# Patient Record
Sex: MALE | Race: AMERICAN INDIAN OR ALASKA NATIVE
[De-identification: names, ages, dates, MRNs, and addresses within clinical notes are randomized per-mention and may not be internally consistent; named-entity substitution may affect disease eponyms.]

---

## 2020-05-05 ENCOUNTER — HOSPITAL ENCOUNTER (EMERGENCY)
Dept: HOSPITAL 5 - ED | Age: 40
Discharge: HOME | End: 2020-05-05
Payer: SELF-PAY

## 2020-05-05 VITALS — SYSTOLIC BLOOD PRESSURE: 105 MMHG | DIASTOLIC BLOOD PRESSURE: 66 MMHG

## 2020-05-05 DIAGNOSIS — G89.29: ICD-10-CM

## 2020-05-05 DIAGNOSIS — M54.5: Primary | ICD-10-CM

## 2020-05-05 DIAGNOSIS — M54.2: ICD-10-CM

## 2020-05-05 DIAGNOSIS — M62.838: ICD-10-CM

## 2020-05-05 DIAGNOSIS — Z79.899: ICD-10-CM

## 2020-05-05 DIAGNOSIS — H93.13: ICD-10-CM

## 2020-05-05 DIAGNOSIS — F17.200: ICD-10-CM

## 2020-05-05 DIAGNOSIS — F12.10: ICD-10-CM

## 2020-05-05 PROCEDURE — 96372 THER/PROPH/DIAG INJ SC/IM: CPT

## 2020-05-05 PROCEDURE — 99282 EMERGENCY DEPT VISIT SF MDM: CPT

## 2020-05-05 NOTE — EMERGENCY DEPARTMENT REPORT
ED Back Pain/Injury HPI





- General


Chief Complaint: Back Pain/Injury


Stated Complaint: EARS RINGING/BACK/NECK PAIN


Source: patient


Limitations: No Limitations





- History of Present Illness


Initial Comments: 





Patient is a 39-year-old -American male with history of chronic low back 

pain and chronic neck pain from lumbar disc and cervical disc disease and who 

presents to the ED with worsening neck and low back pain for the last 2 weeks 

after he ran out of his medication, Percocet 10 mg - 325 mg that he takes for 

his chronic pain.  Patient also complains of bilateral tinnitus for the last 2 

weeks despite using over-the-counter remedies.  Patient denies headache, 

dizziness, nausea, vomiting, dizziness, cough, nasal and sinus congestion, sore 

throat, heavy lifting, fall, traumatic injury, hematuria, abdominal pain, chest 

pain, shortness of breath or numbness and tingling or weakness of upper and 

lower extremities bilaterally.


MD Complaint: back pain (chronic pain), other (neck pain; bilateral tinnitus)


-: Sudden, week(s) (2)


Similar Symptoms Previously: Yes


Place: home


Radiation: none


Severity: severe


Severity scale (0 -10): 7


Quality: sharp, aching


Consistency: constant


Improves With: none


Worsens With: movement, walking


Associated Symptoms: denies other symptoms.  denies: confusion, weakness, chest 

pain, difficulty walking, cough, difficulty urinating, diaphoresis, 

incontinence, constipation, headaches, abdominal pain, loss of appetite, 

malaise, nausea/vomiting, shortness of breath, syncope, other


Treatments Prior to Arrival: NSAIDS





- Related Data


                                  Previous Rx's











 Medication  Instructions  Recorded  Last Taken  Type


 


Baclofen 20 mg PO Q8H PRN #24 tablet 05/05/20 Unknown Rx


 


Naproxen 500 mg PO Q12H PRN #30 tablet 05/05/20 Unknown Rx


 


Nortriptyline HCl 50 mg PO QHS PRN #30 capsule 05/05/20 Unknown Rx


 


Prednisone [predniSONE 10 mg 10 mg PO .TAPER #21 tab.ds.pk 05/05/20 Unknown Rx





(6-Day Pack, 21 Tabs)]    











                                    Allergies











Allergy/AdvReac Type Severity Reaction Status Date / Time


 


No Known Allergies Allergy   Unverified 05/05/20 18:33














ED Review of Systems


ROS: 


Stated complaint: EARS RINGING/BACK/NECK PAIN


Other details as noted in HPI





Constitutional: denies: chills, fever


Eyes: denies: eye pain, eye discharge, vision change


ENT: ear pain (Bilateral tinnitus).  denies: throat pain


Respiratory: denies: cough, shortness of breath, wheezing


Cardiovascular: denies: chest pain, palpitations


Endocrine: no symptoms reported


Gastrointestinal: denies: abdominal pain, nausea, diarrhea


Genitourinary: denies: urgency, dysuria


Musculoskeletal: back pain (lower), arthralgia (neck pain).  denies: joint 

swelling


Skin: denies: rash, lesions


Neurological: denies: headache, weakness, paresthesias


Psychiatric: denies: anxiety, depression


Hematological/Lymphatic: denies: easy bleeding, easy bruising





ED Past Medical Hx





- Past Medical History


Previous Medical History?: No





- Surgical History


Past Surgical History?: No





- Social History


Smoking Status: Current Every Day Smoker


Substance Use Type: Marijuana





- Medications


Home Medications: 


                                Home Medications











 Medication  Instructions  Recorded  Confirmed  Last Taken  Type


 


Baclofen 20 mg PO Q8H PRN #24 tablet 05/05/20  Unknown Rx


 


Naproxen 500 mg PO Q12H PRN #30 tablet 05/05/20  Unknown Rx


 


Nortriptyline HCl 50 mg PO QHS PRN #30 capsule 05/05/20  Unknown Rx


 


Prednisone [predniSONE 10 mg 10 mg PO .TAPER #21 tab.ds.pk 05/05/20  Unknown Rx





(6-Day Pack, 21 Tabs)]     














ED Physical Exam





- General


Limitations: No Limitations


General appearance: alert, in no apparent distress





- Head


Head exam: Present: atraumatic, normocephalic, normal inspection





- Eye


Eye exam: Present: normal appearance, PERRL, EOMI


Pupils: Present: normal accommodation





- ENT


ENT exam: Present: normal exam, normal orophraynx, mucous membranes moist, TM's 

normal bilaterally, normal external ear exam





- Neck


Neck exam: Present: normal inspection, tenderness (Palpable cervical paraspinal 

musculoskeletal tenderness), full ROM.  Absent: meningismus, lymphadenopathy, 

thyromegaly





- Respiratory


Respiratory exam: Present: normal lung sounds bilaterally.  Absent: respiratory 

distress, wheezes, rales, rhonchi, chest wall tenderness, accessory muscle use, 

decreased breath sounds, prolonged expiratory





- Cardiovascular


Cardiovascular Exam: Present: normal rhythm, tachycardia, normal heart sounds.  

Absent: systolic murmur, diastolic murmur, rubs, gallop





- GI/Abdominal


GI/Abdominal exam: Present: soft, normal bowel sounds.  Absent: tenderness, 

guarding, rebound, hyperactive bowel sounds, hypoactive bowel sounds, 

organomegaly





- Extremities Exam


Extremities exam: Present: normal inspection, full ROM, normal capillary refill





- Back Exam


Back exam: Present: normal inspection, full ROM.  Absent: tenderness, CVA 

tenderness (R), CVA tenderness (L), muscle spasm





- Neurological Exam


Neurological exam: Present: alert, oriented X3, CN II-XII intact, normal gait, 

reflexes normal





- Psychiatric


Psychiatric exam: Present: normal affect, normal mood, anxious





- Skin


Skin exam: Present: warm, dry, intact, normal color.  Absent: rash





ED Course


                                   Vital Signs











  05/05/20 05/05/20





  18:31 20:36


 


Temperature 99.7 F H 98.1 F


 


Pulse Rate 115 H 79


 


Respiratory 20 18





Rate  


 


Blood Pressure 122/74 105/66


 


O2 Sat by Pulse 96 94





Oximetry  














ED Medical Decision Making





- Medical Decision Making





This is a 39-year-old -American male with history of chronic low back 

pain and chronic neck pain from lumbar disc and cervical disc disease and who 

presents to the ED with worsening neck and low back pain for the last 2 weeks 

after he ran out of his medication, Percocet 10 mg - 325 mg that he takes for 

his chronic pain.  Patient also complains of bilateral tinnitus for the last 2 

weeks despite using over-the-counter remedies.  In the ED, patient is alert and 

oriented x3 and is not in distress.  Patient was treated for pain in the ED and 

was discharged home on pain medications and muscle relaxants.  Patient was 

advised that chronic pain is not treated in the ED and was given a referral to 

the primary care physician at [facility] for evaluation and 

possible referral to a pain clinic.  Patient was advised to return to the ED 

immediately if symptoms get worse.





- Differential Diagnosis


chronic pain; tinnitus; muscle spasm; cervical sprain


Critical care attestation.: 


If time is entered above; I have spent that time in minutes in the direct care 

of this critically ill patient, excluding procedure time.








ED Disposition


Clinical Impression: 


 Acute exacerbation of chronic low back pain, Chronic cervical pain, Cervical 

paraspinous muscle spasm, Bilateral tinnitus





Disposition: DC-01 TO HOME OR SELFCARE


Is pt being admited?: No


Does the pt Need Aspirin: No


Condition: Stable


Instructions:  Muscle Strain (ED), Cervical Sprain (ED), Chronic Back Pain (ED)


Additional Instructions: 


Note that the ED does not treat chronic pain and therefore there is need for you

to follow-up with your primary care physician in order for them to refer you to 

a pain clinic for the management of your chronic pain.  Take medication with 

food, drink plenty of fluids and follow-up with your primary care physician in 5

to 7 days for reevaluation.  Return to the ED immediately if symptoms get worse.


Prescriptions: 


Nortriptyline HCl 50 mg PO QHS PRN #30 capsule


 PRN Reason: tinnitus


Baclofen 20 mg PO Q8H PRN #24 tablet


 PRN Reason: Muscle Spasm


Naproxen 500 mg PO Q12H PRN #30 tablet


 PRN Reason: Pain , Severe (7-10)


Prednisone [predniSONE 10 mg (6-Day Pack, 21 Tabs)] 10 mg PO .TAPER #21 

tab.ds.pk


Referrals: 


[facility] [Provider Group] - 3-5 Days


Time of Disposition: 20:16


Print Language: ENGLISH